# Patient Record
Sex: FEMALE | Race: WHITE
[De-identification: names, ages, dates, MRNs, and addresses within clinical notes are randomized per-mention and may not be internally consistent; named-entity substitution may affect disease eponyms.]

---

## 2018-05-29 ENCOUNTER — HOSPITAL ENCOUNTER (EMERGENCY)
Dept: HOSPITAL 7 - FB.ED | Age: 83
Discharge: HOME | End: 2018-05-29
Payer: MEDICARE

## 2018-05-29 VITALS — SYSTOLIC BLOOD PRESSURE: 185 MMHG | DIASTOLIC BLOOD PRESSURE: 68 MMHG

## 2018-05-29 DIAGNOSIS — E11.9: ICD-10-CM

## 2018-05-29 DIAGNOSIS — I10: ICD-10-CM

## 2018-05-29 DIAGNOSIS — H81.10: ICD-10-CM

## 2018-05-29 DIAGNOSIS — R55: Primary | ICD-10-CM

## 2018-05-29 PROCEDURE — 85025 COMPLETE CBC W/AUTO DIFF WBC: CPT

## 2018-05-29 PROCEDURE — 82962 GLUCOSE BLOOD TEST: CPT

## 2018-05-29 PROCEDURE — 36415 COLL VENOUS BLD VENIPUNCTURE: CPT

## 2018-05-29 PROCEDURE — 81001 URINALYSIS AUTO W/SCOPE: CPT

## 2018-05-29 PROCEDURE — 83880 ASSAY OF NATRIURETIC PEPTIDE: CPT

## 2018-05-29 PROCEDURE — 96360 HYDRATION IV INFUSION INIT: CPT

## 2018-05-29 PROCEDURE — 84484 ASSAY OF TROPONIN QUANT: CPT

## 2018-05-29 PROCEDURE — 99284 EMERGENCY DEPT VISIT MOD MDM: CPT

## 2018-05-29 PROCEDURE — 80048 BASIC METABOLIC PNL TOTAL CA: CPT

## 2018-05-29 PROCEDURE — 93005 ELECTROCARDIOGRAM TRACING: CPT

## 2018-05-29 NOTE — EDM.PDOC
ED HPI GENERAL MEDICAL PROBLEM





- General


Stated Complaint: DIZZINESS


Time Seen by Provider: 05/29/18 11:58


Source of Information: Reports: Patient, Family


History Limitations: Reports: No Limitations





- History of Present Illness


INITIAL COMMENTS - FREE TEXT/NARRATIVE: 





82 y.o.w.f with IDDM came to the ed with her SO after she was close of passing 

out while getting off a chair. Pt is doing fine now. She drinks a lot of water 

and does not think she is dehydrated. Pt i doing fine now while in supine 

position in the examination bed. No N/V/D or dizziness while resting. No other 

acute medical issues.  /74 pulse 88 RR 18 Pulse ox 95 Temp 36.6


Onset Date: 05/29/18


Onset Time: 07:00


Duration: Hour(s):, Intermittent


Location: Reports: Generalized


Quality: Reports: Other (dizziness)


Severity: Moderate


Improves with: Reports: Rest


Worsens with: Reports: Movement


Context: Reports: Other (getting up from a chair)


Associated Symptoms: Reports: Syncope (near)





- Related Data


 Allergies











Allergy/AdvReac Type Severity Reaction Status Date / Time


 


No Known Allergies Allergy   Verified 05/29/18 19:21











Home Meds: 


 Home Meds





NK [No Known Home Meds]  05/29/18 [History]











ED ROS GENERAL





- Review of Systems


Review Of Systems: See Below


Constitutional: Reports: No Symptoms


HEENT: Reports: No Symptoms


Respiratory: Reports: No Symptoms


Cardiovascular: Reports: No Symptoms


Endocrine: Reports: Other (H/O IDDM)


GI/Abdominal: Reports: No Symptoms


: Reports: No Symptoms


Musculoskeletal: Reports: No Symptoms


Skin: Reports: No Symptoms


Neurological: Reports: No Symptoms


Psychiatric: Reports: No Symptoms


Hematologic/Lymphatic: Reports: No Symptoms


Immunologic: Reports: No Symptoms





- Physical Exam


Exam: See Below


Exam Limited By: No Limitations


General Appearance: Alert, WD/WN, Mild Distress


Eye Exam: Right Eye: Proptosis, Bilateral Eye: Nystagmus (BPPv)


Ears: Normal External Exam


Nose: Normal Inspection, Normal Mucosa, No Blood


Throat/Mouth: Normal Inspection, Normal Lips, Normal Gums, Normal Voice, No 

Airway Compromise


Head Exam: Atraumatic, Normocephalic


Neck: Normal Inspection, Supple, Non-Tender


Respiratory/Chest: No Respiratory Distress, Lungs Clear, Normal Breath Sounds, 

No Accessory Muscle Use, Chest Non-Tender


Cardiovascular: Normal Peripheral Pulses, Regular Rate, Rhythm, No Edema, No 

Gallop, No JVD, No Murmur


GI/Abdominal: Normal Bowel Sounds, Soft, Non-Tender, No Organomegaly, No 

Distention, No Abnormal Bruit, No Mass, Pelvis Stable


 (Female) Exam: Deferred


Rectal (Female) Exam: Deferred


Neuro Exam (Abbreviated): Alert, Oriented, CN II-XII Intact


Back Exam: Normal Inspection, Full Range of Motion


Extremities: Normal Inspection, Normal Range of Motion, Non-Tender, No Pedal 

Edema, Normal Capillary Refill


Psychiatric: Normal Affect, Normal Mood


Skin Exam: Warm, Dry, Intact, Normal Color, No Rash





EKG INTERPRETATION


EKG Date: 05/29/18


Time: 12:25


Rhythm: NSR


Rate (Beats/Min): 67


Axis: Normal


P-Wave: Present


QRS: Normal


ST-T: Normal


QT: Normal


Comparison: NA - No Prior EKG





Course





- Vital Signs


Text/Narrative:: 





82 y.o.w.f with IDDM came to the ed with her SO after she was close of passing 

out while getting off a chair. Pt is doing fine now. She drinks a lot of water 

and does not think she is dehydrated. Pt i doing fine now while in supine 

position in the examination bed. No N/V/D or dizziness while resting. No other 

acute medical issues.  /74 pulse 88 RR 18 Pulse ox 95 Temp 36.6


PE: 82 y.o.w.f with near syncope, IDDM HTN, Stan nystagmus.


Labs:  CBC nl BUN 28 Cr 0.8 Glc 125 Gfr>60 BUN/CR ration > 40 UA neg Trop 0.017 




Impression: BPPV, Dehydration, HTN


Tx: NS. Antivert


Reexam: Improved, pt was ambulating fine on D/C. Please check Nursing not for 

Vitals on discharge


Plan: D/C with instructions





Last Recorded V/S: 


 Last Vital Signs











Temp  36.4 C   05/29/18 16:26


 


Pulse  89   05/29/18 16:26


 


Resp  20   05/29/18 16:26


 


BP  185/68 H  05/29/18 16:26


 


Pulse Ox  99   05/29/18 16:26








 





Orthostatic Blood Pressure [     181/95


Standing]                        


Orthostatic Blood Pressure [     163/69


Sitting]                         


Orthostatic Blood Pressure [     146/57


Supine]                          











- Orders/Labs/Meds


Orders: 


 Active Orders 24 hr











 Category Date Time Status


 


 Blood Glucose Check, Bedside [RC] ONETIME Care  05/29/18 15:31 Active


 


 Orthostatic Vital Signs [RC] ASDIRECTED Care  05/29/18 13:07 Active


 


 UA W/MICROSCOPIC [URIN] Stat Lab  05/29/18 13:50 Ordered


 


 Peripheral IV Insertion Adult [OM.PC] Routine Oth  05/29/18 12:59 Ordered


 


 EKG 12 Lead [EK] Routine Ther  05/29/18 12:11 Ordered











Labs: 


 Laboratory Tests











  05/29/18 05/29/18 05/29/18 Range/Units





  12:15 12:15 12:15 


 


WBC  6.0    (4.5-12.0)  X10-3/uL


 


RBC  4.11    (3.23-5.20)  x10(6)uL


 


Hgb  13.7    (11.5-15.5)  g/dL


 


Hct  41.0    (30.0-51.3)  %


 


MCV  99.9 H    (80-96)  fL


 


MCH  33.5    (27.7-33.6)  pg


 


MCHC  33.5    (32.2-35.4)  g/dL


 


RDW  12.7    (11.5-15.5)  %


 


Plt Count  242    (125-369)  X10(3)uL


 


MPV  7.6    (7.4-10.4)  fL


 


Neut % (Auto)  66.5    (46-82)  %


 


Lymph % (Auto)  22.7    (13-37)  %


 


Mono % (Auto)  8.5    (4-12)  %


 


Eos % (Auto)  2    (1.0-5.0)  %


 


Baso % (Auto)  1    (0-2)  %


 


Neut # (Auto)  4.0    (1.6-8.3)  #


 


Lymph # (Auto)  1.4    (0.6-5.0)  #


 


Mono # (Auto)  0.5    (0.0-1.3)  #


 


Eos # (Auto)  0.1    (0.0-0.8)  #


 


Baso # (Auto)  0.0    (0.0-0.2)  #


 


Sodium   140   (135-145)  mmol/L


 


Potassium   4.2   (3.5-5.3)  mmol/L


 


Chloride   103   (100-110)  mmol/L


 


Carbon Dioxide   28   (21-32)  mmol/L


 


BUN   28 H   (7-18)  mg/dL


 


Creatinine   0.7   (0.55-1.02)  mg/dL


 


Est Cr Clr Drug Dosing   TNP   


 


Estimated GFR (MDRD)   > 60   (>60)  


 


BUN/Creatinine Ratio   40.0 H   (9-20)  


 


Glucose   154 H   ()  mg/dL


 


Calcium   9.5   (8.6-10.2)  mg/dL


 


Troponin I    < 0.017 L  (<0.017-0.056)  ng/mL


 


NT-Pro-B Natriuret Pep     (<=450)  pg/mL


 


Urine Color     (YELLOW)  


 


Urine Appearance     (CLEAR)  


 


Urine pH     (5.0-6.5)  


 


Ur Specific Gravity     (1.010-1.025)  


 


Urine Protein     (NEGATIVE)  mg/dL


 


Urine Glucose (UA)     (NEGATIVE)  mg/dL


 


Urine Ketones     (NEGATIVE)  mg/dL


 


Urine Occult Blood     (NEGATIVE)  


 


Urine Nitrite     (NEGATIVE)  


 


Urine Bilirubin     (NEGATIVE)  


 


Urine Urobilinogen     (NEGATIVE)  mg/dL


 


Ur Leukocyte Esterase     (NEGATIVE)  


 


Urine WBC     (0)  


 


Ur Squamous Epith Cells     (NS,R,O)  


 


Urine Bacteria     (NS)  














  05/29/18 05/29/18 Range/Units





  12:15 13:50 


 


WBC    (4.5-12.0)  X10-3/uL


 


RBC    (3.23-5.20)  x10(6)uL


 


Hgb    (11.5-15.5)  g/dL


 


Hct    (30.0-51.3)  %


 


MCV    (80-96)  fL


 


MCH    (27.7-33.6)  pg


 


MCHC    (32.2-35.4)  g/dL


 


RDW    (11.5-15.5)  %


 


Plt Count    (125-369)  X10(3)uL


 


MPV    (7.4-10.4)  fL


 


Neut % (Auto)    (46-82)  %


 


Lymph % (Auto)    (13-37)  %


 


Mono % (Auto)    (4-12)  %


 


Eos % (Auto)    (1.0-5.0)  %


 


Baso % (Auto)    (0-2)  %


 


Neut # (Auto)    (1.6-8.3)  #


 


Lymph # (Auto)    (0.6-5.0)  #


 


Mono # (Auto)    (0.0-1.3)  #


 


Eos # (Auto)    (0.0-0.8)  #


 


Baso # (Auto)    (0.0-0.2)  #


 


Sodium    (135-145)  mmol/L


 


Potassium    (3.5-5.3)  mmol/L


 


Chloride    (100-110)  mmol/L


 


Carbon Dioxide    (21-32)  mmol/L


 


BUN    (7-18)  mg/dL


 


Creatinine    (0.55-1.02)  mg/dL


 


Est Cr Clr Drug Dosing    


 


Estimated GFR (MDRD)    (>60)  


 


BUN/Creatinine Ratio    (9-20)  


 


Glucose    ()  mg/dL


 


Calcium    (8.6-10.2)  mg/dL


 


Troponin I    (<0.017-0.056)  ng/mL


 


NT-Pro-B Natriuret Pep  550 H   (<=450)  pg/mL


 


Urine Color   Yellow  (YELLOW)  


 


Urine Appearance   Clear  (CLEAR)  


 


Urine pH   6.0  (5.0-6.5)  


 


Ur Specific Gravity   1.015  (1.010-1.025)  


 


Urine Protein   Negative  (NEGATIVE)  mg/dL


 


Urine Glucose (UA)   Normal  (NEGATIVE)  mg/dL


 


Urine Ketones   Negative  (NEGATIVE)  mg/dL


 


Urine Occult Blood   Negative  (NEGATIVE)  


 


Urine Nitrite   Negative  (NEGATIVE)  


 


Urine Bilirubin   Negative  (NEGATIVE)  


 


Urine Urobilinogen   Normal  (NEGATIVE)  mg/dL


 


Ur Leukocyte Esterase   Negative  (NEGATIVE)  


 


Urine WBC   0-5  (0)  


 


Ur Squamous Epith Cells   Few H  (NS,R,O)  


 


Urine Bacteria   Few H  (NS)  











Meds: 


Medications














Discontinued Medications














Generic Name Dose Route Start Last Admin





  Trade Name Freq  PRN Reason Stop Dose Admin


 


Sodium Chloride  1,000 mls @ 999 mls/hr  05/29/18 12:56  05/29/18 13:01





  Normal Saline  IV  05/29/18 13:56  999 mls/hr





  .BOLUS ONE   Administration





     





     





     





     


 


Meclizine HCl  50 mg  05/29/18 15:22  05/29/18 15:31





  Antivert  PO  05/29/18 15:23  50 mg





  ONETIME ONE   Administration





     





     





     





     


 


Sodium Chloride  10 ml  05/29/18 12:59  05/29/18 13:00





  Saline Flush  FLUSH   10 ml





  ASDIRECTED PRN   Administration





  Keep Vein Open   





     





     





     














Departure





- Departure


Time of Disposition: 15:19


Disposition: Home, Self-Care 01


Condition: Good


Clinical Impression: 


 Vasovagal near syncope, Vertigo








- Discharge Information


Instructions:  Meclizine tablets or capsules, Vertigo, Easy-to-Read


Referrals: 


Bruce Myers MD [Primary Care Provider] - 


Forms:  ED Department Discharge


Additional Instructions: 


please increase water intake, please cont your current meds, take antivert for 

dizziness,  please f/u with your PMD, come back if your symptoms get worse 

acutely





- My Orders


Last 24 Hours: 


My Active Orders





05/29/18 12:11


EKG 12 Lead [EK] Routine 





05/29/18 12:59


Peripheral IV Insertion Adult [OM.PC] Routine 





05/29/18 13:07


Orthostatic Vital Signs [RC] ASDIRECTED 





05/29/18 13:50


UA W/MICROSCOPIC [URIN] Stat 





05/29/18 15:31


Blood Glucose Check, Bedside [RC] ONETIME 














- Assessment/Plan


Last 24 Hours: 


My Active Orders





05/29/18 12:11


EKG 12 Lead [EK] Routine 





05/29/18 12:59


Peripheral IV Insertion Adult [OM.PC] Routine 





05/29/18 13:07


Orthostatic Vital Signs [RC] ASDIRECTED 





05/29/18 13:50


UA W/MICROSCOPIC [URIN] Stat 





05/29/18 15:31


Blood Glucose Check, Bedside [RC] ONETIME

## 2020-09-09 NOTE — OR
DATE OF OPERATION:  09/08/2020

 

SURGEON:  Pam Tijerina MD

 

PREOPERATIVE DIAGNOSIS:  Visually significant cataract, right eye.

 

POSTOPERATIVE DIAGNOSIS:  Visually significant cataract, right eye.

 

PROCEDURES PERFORMED:  Phacoemulsification with intraocular lens placement,

right eye.

 

ASSISTANTS:  None.

 

ANESTHESIA:  Local with sedation.

 

COMPLICATIONS:  None.

 

BLOOD LOSS:  None.

 

IMPLANTS:  Dennis [id number] diopter lens implanted, serial #[id number].

 

CDE:  3.53.

 

DESCRIPTION OF PROCEDURE:  After risks and benefits were reviewed with the

patient, consent was obtained in the preoperative area, and the operative eye

was marked with a surgical pen.  In the preoperative area, a pledget was used to

dilate the pupil consisting of a mixture of phenylephrine 10%, cyclopentolate

2%, moxifloxacin 0.5%, and bupivacaine 0.75%.  The patient was taken to the

operating room, where a time-out was performed, and the patient was placed under

monitored anesthesia care.  Topical tetracaine was used for anesthesia.

 

The operative eye was prepped and draped for ophthalmic surgery, and the

microscope was brought into position and focused.  A paracentesis incision was

made, followed by injection of preservative-free 1% lidocaine into the anterior

chamber, followed by injection of Viscoat into the anterior chamber.  A

microkeratome blade was used to make a corneal limbal incision temporally.  A

cystotome was used to make the beginning of the capsulorrhexis, which was

carried around 360 degrees in a curvilinear fashion using Utrata forceps.  A

Dominguez cannula with BSS was used to hydrodissect and hydrodelineate the nucleus.

The nucleus was removed in a divide and conquer manner using

phacoemulsification.  Irrigation and aspiration were used to remove the

remaining cortical material.  Provisc was used to inflate the capsular bag, and

a pre-loaded 16.0 diopter lens, serial number [id number] was injected into the

capsular bag.  A Sinskey hook was used to position and center the lens.

 

Next, irrigation and aspiration was used to remove any remaining viscoelastic

and cortical material from the anterior chamber.  BSS on a cannula was used to

inflate the anterior chamber and hydrate the wound.  The wound was checked and

found to be watertight.  1 mg of Moxifloxacin was injected into the anterior

chamber.  Drapes were removed and the eye was cleaned.  A drop of brimonidine

0.15% and a drop of TobraDex was placed.  The eye was shielded, and the patient

was taken to the recovery room in stable condition.

 

CC:  Clay Esparza MD

CC:  Norah Santamaria OD

CC:  Pam Tijerina MD

 

Job#: [id number]

DD: 09/08/2020 0920

DT: 09/08/2020 1640 AK/MICHELET

## 2020-09-22 NOTE — OR
DATE OF OPERATION:  09/22/2020

 

SURGEON:  Pam Tijerina MD

 

PREOPERATIVE DIAGNOSIS:  Visually significant cataract, left eye.

 

POSTOPERATIVE DIAGNOSIS:  Visually significant cataract, left eye.

 

PROCEDURES PERFORMED:  Phacoemulsification with intraocular lens placement, left

eye.

 

ASSISTANTS:  None.

 

ANESTHESIA:  Local with sedation.

 

COMPLICATIONS:  None.

 

BLOOD LOSS:  None.

 

IMPLANTS:  An Dennis [id number] 18.0 diopter lens implanted.

 

CDE:  3.93.

 

DESCRIPTION OF PROCEDURE:  After risks and benefits were reviewed with the

patient, consent was obtained in the preoperative area, and the operative eye

was marked with a surgical pen.  In the preoperative area, a pledget was used to

dilate the pupil consisting of a mixture of phenylephrine 10%, cyclopentolate

2%, moxifloxacin 0.5%, and bupivacaine 0.75%.  The patient was taken to the

operating room, where a time-out was performed, and the patient was placed under

monitored anesthesia care.  Topical tetracaine was used for anesthesia.

 

The operative eye was prepped and draped for ophthalmic surgery, and the

microscope was brought into position and focused.  A paracentesis incision was

made, followed by injection of preservative-free 1% lidocaine into the anterior

chamber, followed by injection of Viscoat into the anterior chamber.  A

microkeratome blade was used to make a corneal limbal incision temporally.  A

cystotome was used to make the beginning of the capsulorrhexis, which was

carried around 360 degrees in a curvilinear fashion using Utrata forceps.  A

Dominguez cannula with BSS was used to hydrodissect and hydrodelineate the nucleus.

The nucleus was removed in a divide and conquer manner using

phacoemulsification.  Irrigation and aspiration were used to remove the

remaining cortical material.  Provisc was used to inflate the capsular bag, and

a pre-loaded Dennis [id number] 18.0 diopter lens, serial number [id number] was

injected into the capsular bag.  A Sinskey hook was used to position and center

the lens.

 

Next, irrigation and aspiration was used to remove any remaining viscoelastic

and cortical material from the anterior chamber.  BSS on a cannula was used to

inflate the anterior chamber and hydrate the wound.  The wound was checked and

found to be watertight.  1 mg of Moxifloxacin was injected into the anterior

chamber.  Drapes were removed and the eye was cleaned.  A drop of brimonidine

0.15% and a drop of TobraDex was placed.  The eye was shielded, and the patient

was taken to the recovery room in stable condition.

 

Job#: [id number]

DD: 09/22/2020 0824

DT: 09/22/2020 0845 AK/MICHELET



CC:  RENITA OG OD

CC:  ALTON MALDONADO MD

MTDD